# Patient Record
Sex: FEMALE | Race: WHITE | NOT HISPANIC OR LATINO | Employment: FULL TIME | URBAN - METROPOLITAN AREA
[De-identification: names, ages, dates, MRNs, and addresses within clinical notes are randomized per-mention and may not be internally consistent; named-entity substitution may affect disease eponyms.]

---

## 2017-09-05 ENCOUNTER — APPOINTMENT (OUTPATIENT)
Dept: WOMENS IMAGING | Facility: HOSPITAL | Age: 52
End: 2017-09-05

## 2017-09-05 PROCEDURE — 77063 BREAST TOMOSYNTHESIS BI: CPT | Performed by: RADIOLOGY

## 2017-09-05 PROCEDURE — G0202 SCR MAMMO BI INCL CAD: HCPCS | Performed by: RADIOLOGY

## 2017-09-05 PROCEDURE — 77067 SCR MAMMO BI INCL CAD: CPT | Performed by: RADIOLOGY

## 2018-11-29 ENCOUNTER — APPOINTMENT (OUTPATIENT)
Dept: WOMENS IMAGING | Facility: HOSPITAL | Age: 53
End: 2018-11-29

## 2018-11-29 PROCEDURE — 77063 BREAST TOMOSYNTHESIS BI: CPT | Performed by: RADIOLOGY

## 2018-11-29 PROCEDURE — 77067 SCR MAMMO BI INCL CAD: CPT | Performed by: RADIOLOGY

## 2019-03-08 ENCOUNTER — PREP FOR SURGERY (OUTPATIENT)
Dept: OTHER | Facility: HOSPITAL | Age: 54
End: 2019-03-08

## 2019-03-08 DIAGNOSIS — Z12.11 COLON CANCER SCREENING: Primary | ICD-10-CM

## 2019-03-08 DIAGNOSIS — Z80.3 FAMILY HISTORY OF BREAST CANCER: ICD-10-CM

## 2019-03-12 PROBLEM — Z12.11 COLON CANCER SCREENING: Status: ACTIVE | Noted: 2019-03-12

## 2019-03-12 PROBLEM — Z80.3 FAMILY HISTORY OF BREAST CANCER: Status: ACTIVE | Noted: 2019-03-12

## 2019-05-13 ENCOUNTER — ANESTHESIA (OUTPATIENT)
Dept: GASTROENTEROLOGY | Facility: HOSPITAL | Age: 54
End: 2019-05-13

## 2019-05-13 ENCOUNTER — ANESTHESIA EVENT (OUTPATIENT)
Dept: GASTROENTEROLOGY | Facility: HOSPITAL | Age: 54
End: 2019-05-13

## 2019-05-13 ENCOUNTER — HOSPITAL ENCOUNTER (OUTPATIENT)
Facility: HOSPITAL | Age: 54
Setting detail: HOSPITAL OUTPATIENT SURGERY
Discharge: HOME | End: 2019-05-13
Attending: SURGERY | Admitting: SURGERY

## 2019-05-13 VITALS
BODY MASS INDEX: 21.72 KG/M2 | RESPIRATION RATE: 14 BRPM | DIASTOLIC BLOOD PRESSURE: 76 MMHG | SYSTOLIC BLOOD PRESSURE: 100 MMHG | WEIGHT: 135.13 LBS | HEIGHT: 66 IN | HEART RATE: 54 BPM | TEMPERATURE: 98.4 F | OXYGEN SATURATION: 99 %

## 2019-05-13 DIAGNOSIS — Z12.11 COLON CANCER SCREENING: ICD-10-CM

## 2019-05-13 DIAGNOSIS — Z80.3 FAMILY HISTORY OF BREAST CANCER: ICD-10-CM

## 2019-05-13 LAB
B-HCG UR QL: NEGATIVE
INTERNAL NEGATIVE CONTROL: NEGATIVE
INTERNAL POSITIVE CONTROL: POSITIVE
Lab: NORMAL

## 2019-05-13 PROCEDURE — 45380 COLONOSCOPY AND BIOPSY: CPT | Performed by: SURGERY

## 2019-05-13 PROCEDURE — 88305 TISSUE EXAM BY PATHOLOGIST: CPT | Performed by: SURGERY

## 2019-05-13 PROCEDURE — 81025 URINE PREGNANCY TEST: CPT | Performed by: SURGERY

## 2019-05-13 PROCEDURE — 25010000002 GLUCAGON (RDNA) PER 1 MG: Performed by: SURGERY

## 2019-05-13 PROCEDURE — 25010000002 PROPOFOL 10 MG/ML EMULSION: Performed by: NURSE ANESTHETIST, CERTIFIED REGISTERED

## 2019-05-13 RX ORDER — LIDOCAINE HYDROCHLORIDE 20 MG/ML
INJECTION, SOLUTION INFILTRATION; PERINEURAL AS NEEDED
Status: DISCONTINUED | OUTPATIENT
Start: 2019-05-13 | End: 2019-05-13 | Stop reason: SURG

## 2019-05-13 RX ORDER — SODIUM CHLORIDE 0.9 % (FLUSH) 0.9 %
3-10 SYRINGE (ML) INJECTION AS NEEDED
Status: DISCONTINUED | OUTPATIENT
Start: 2019-05-13 | End: 2019-05-13 | Stop reason: HOSPADM

## 2019-05-13 RX ORDER — PROPOFOL 10 MG/ML
VIAL (ML) INTRAVENOUS CONTINUOUS PRN
Status: DISCONTINUED | OUTPATIENT
Start: 2019-05-13 | End: 2019-05-13 | Stop reason: SURG

## 2019-05-13 RX ORDER — SODIUM CHLORIDE, SODIUM LACTATE, POTASSIUM CHLORIDE, CALCIUM CHLORIDE 600; 310; 30; 20 MG/100ML; MG/100ML; MG/100ML; MG/100ML
30 INJECTION, SOLUTION INTRAVENOUS CONTINUOUS PRN
Status: DISCONTINUED | OUTPATIENT
Start: 2019-05-13 | End: 2019-05-13 | Stop reason: HOSPADM

## 2019-05-13 RX ORDER — PROPOFOL 10 MG/ML
VIAL (ML) INTRAVENOUS AS NEEDED
Status: DISCONTINUED | OUTPATIENT
Start: 2019-05-13 | End: 2019-05-13 | Stop reason: SURG

## 2019-05-13 RX ADMIN — SODIUM CHLORIDE, POTASSIUM CHLORIDE, SODIUM LACTATE AND CALCIUM CHLORIDE 30 ML/HR: 600; 310; 30; 20 INJECTION, SOLUTION INTRAVENOUS at 07:28

## 2019-05-13 RX ADMIN — SODIUM CHLORIDE, POTASSIUM CHLORIDE, SODIUM LACTATE AND CALCIUM CHLORIDE: 600; 310; 30; 20 INJECTION, SOLUTION INTRAVENOUS at 08:21

## 2019-05-13 RX ADMIN — PROPOFOL 200 MCG/KG/MIN: 10 INJECTION, EMULSION INTRAVENOUS at 08:24

## 2019-05-13 RX ADMIN — LIDOCAINE HYDROCHLORIDE 60 MG: 20 INJECTION, SOLUTION INFILTRATION; PERINEURAL at 08:24

## 2019-05-13 RX ADMIN — PROPOFOL 50 MG: 10 INJECTION, EMULSION INTRAVENOUS at 08:24

## 2019-05-13 NOTE — H&P
Cc: Endoscopy Visit    HPI: 54 y.o. female here for screening, her first, with no family history of colon cancer.    History reviewed. No pertinent past medical history.    History reviewed. No pertinent surgical history.    has No Known Allergies.       Medication List      ASK your doctor about these medications    progesterone 100 MG capsule  Commonly known as:  PROMETRIUM          History reviewed. No pertinent family history.    Social History     Socioeconomic History   • Marital status:      Spouse name: Not on file   • Number of children: Not on file   • Years of education: Not on file   • Highest education level: Not on file   Tobacco Use   • Smoking status: Never Smoker   • Smokeless tobacco: Never Used   Substance and Sexual Activity   • Alcohol use: Yes     Comment: SOCIAL   • Drug use: No   • Sexual activity: Defer       Vitals:    05/13/19 0716   BP: 112/72   Pulse: 59   Resp: 14   Temp: 98.4 °F (36.9 °C)   SpO2: 100%       Body mass index is 21.81 kg/m².    Physical Exam    General: No acute distress  Lungs: No labored breathing, Pulse oximetry on room air is 100%.  Heart: RRR  Abdo: Soft  Mental:  Awake, alert, and oriented    Imp:     · Screening, average risk     Plan:  · C scope    Chayo Crow MD  8:23 AM

## 2019-05-13 NOTE — ANESTHESIA POSTPROCEDURE EVALUATION
Patient: Latonia Castro    Procedure Summary     Date:  05/13/19 Room / Location:   [facility]    Anesthesia Start:  0821 Anesthesia Stop:  0841    Procedure:  COLONOSCOPY INTO CECUM AND TERMINAL ILEUM WITH COLD BIOPSY POLYPECTOMY (N/A ) Diagnosis:       Colon cancer screening      Family history of breast cancer      (Colon cancer screening [Z12.11])      (Family history of breast cancer [Z80.3])    Surgeon:  Chayo Crow MD Provider:  Betsy Roper MD    Anesthesia Type:  MAC ASA Status:  1          Anesthesia Type: MAC  Last vitals  BP   100/76 (05/13/19 0900)   Temp   36.9 °C (98.4 °F) (05/13/19 0716)   Pulse   54 (05/13/19 0900)   Resp   14 (05/13/19 0900)     SpO2   99 % (05/13/19 0900)     Post Anesthesia Care and Evaluation    Patient location during evaluation: PHASE II  Level of consciousness: awake and alert  Anesthetic complications: No anesthetic complications

## 2019-05-13 NOTE — ANESTHESIA PREPROCEDURE EVALUATION
Anesthesia Evaluation     no history of anesthetic complications:               Airway   Mallampati: I  TM distance: >3 FB  Neck ROM: full  Dental - normal exam     Pulmonary    (-) shortness of breath, recent URI, sleep apnea, not a smoker  Cardiovascular     (+) hyperlipidemia,   (-) hypertension, dysrhythmias, angina      Neuro/Psych  (-) headaches  GI/Hepatic/Renal/Endo    (-) liver disease, no renal disease, diabetes    Musculoskeletal     Abdominal    Substance History      OB/GYN          Other                        Anesthesia Plan    ASA 1     MAC     intravenous induction   Anesthetic plan, all risks, benefits, and alternatives have been provided, discussed and informed consent has been obtained with: patient.

## 2019-05-13 NOTE — OP NOTE
Colonoscopy Procedure Note  Latonia Castro  1965  Date of Procedure: 05/13/19    Pre-operative Diagnosis:    · Screening, average risk    Post-operative Diagnosis:  · Melanosis coli  · Ascending colon polyp, 5 mm, irregular  · Single sigmoid diverticulum    Procedure: Colonoscopy with cold biopsy polypectomy, terminal ileoscopy     Findings/Treatments:   · Melanosis coli  · Ascending colon polyp, 5 mm, irregular  · Single sigmoid diverticulum       Recommendations:   · Colonoscopy in 5 years likely, based on pathology.  We will call  · Discontinue use of senna, cascara, or aloe to reverse findings of melanosis.  · Keep a copy of the photographs of the procedure given to you today for possible need for reference in the future.    Surgeon: Mignon    Anesthetic: MAC per Betsy Roper MD    Indications:  As above     Scope Withdrawal Time:  6 minutes  31 seconds    Procedure Details     MAC anesthesia was induced.  The 180 Colonoscopy was inserted blindly into the rectum and advanced to the cecum, with relative ease,  without need for pressure, lift, or turning.    Cecum was identified by the appendiceal orifice and the ileocecal valve and photographed for documentation.  Terminal ileum was intubated and was normal.    Prep quality was very good, but still required suction..  A careful inspection was made as the scope was withdrawn, including a retroflexed view of the rectum; there was no suggestion of presence of angiodysplasias, colitis, but there was the single polyp and a single sigmoid diverticula, with interventions as listed.  There was melanosis coli throughout.    Retroflexion in the rectum revealed no abnormalities.    Chayo Crow MD  05/13/19  8:40 AM

## 2019-05-14 LAB
CYTO UR: NORMAL
LAB AP CASE REPORT: NORMAL
PATH REPORT.FINAL DX SPEC: NORMAL
PATH REPORT.GROSS SPEC: NORMAL

## 2019-05-15 NOTE — PROGRESS NOTES
[place] (endoscopy liaison),    Please call patient to inform them of these findings and recommendations and ensure that any pamphlets that were to be given to the patient at the hospital were received.     Please make sure a letter is sent to the patient, recall method entered into the computer and the HIM tab updated as far as need for endoscopy follow up.    Thanks  Dr Crow    Date of Procedure: 05/13/19     Pre-operative Diagnosis:    · Screening, average risk     Post-operative Diagnosis:  · Melanosis coli  · Ascending colon polyp, 5 mm, irregular  · Single sigmoid diverticulum     Procedure: Colonoscopy with cold biopsy polypectomy, terminal ileoscopy      Findings/Treatments:   · Melanosis coli  · Ascending colon polyp, 5 mm, irregular;  TUBULAR ADENOMA, TRIGGERING C SCOPE IN 5 YEARS  · Single sigmoid diverticulum    Recommendations:   · Colonoscopy in 5 years likely, based on pathology.  We will call.  5 YEARS  · Discontinue use of senna, cascara, or aloe to reverse findings of melanosis.;  YES.  SHOULD USE FIBER SUPPLEMENT INSTEAD.  · Keep a copy of the photographs of the procedure given to you today for possible need for reference in the future.

## 2019-05-16 ENCOUNTER — TELEPHONE (OUTPATIENT)
Dept: SURGERY | Facility: CLINIC | Age: 54
End: 2019-05-16

## 2019-05-16 NOTE — TELEPHONE ENCOUNTER
----- Message from Chayo Crow MD sent at 5/15/2019  6:39 PM EDT -----  [place] (endoscopy liaison),    Please call patient to inform them of these findings and recommendations and ensure that any pamphlets that were to be given to the patient at the hospital were received.     Please make sure a letter is sent to the patient, recall method entered into the computer and the HIM tab updated as far as need for endoscopy follow up.    Thanks  Dr Crow    Date of Procedure: 05/13/19     Pre-operative Diagnosis:    · Screening, average risk     Post-operative Diagnosis:  · Melanosis coli  · Ascending colon polyp, 5 mm, irregular  · Single sigmoid diverticulum     Procedure: Colonoscopy with cold biopsy polypectomy, terminal ileoscopy      Findings/Treatments:   · Melanosis coli  · Ascending colon polyp, 5 mm, irregular;  TUBULAR ADENOMA, TRIGGERING C SCOPE IN 5 YEARS  · Single sigmoid diverticulum                                        Recommendations:   · Colonoscopy in 5 years likely, based on pathology.  We will call.  5 YEARS  · Discontinue use of senna, cascara, or aloe to reverse findings of melanosis.;  YES.  SHOULD USE FIBER SUPPLEMENT INSTEAD.  · Keep a copy of the photographs of the procedure given to you today for possible need for reference in the future.

## 2019-05-17 ENCOUNTER — TELEPHONE (OUTPATIENT)
Dept: SURGERY | Facility: CLINIC | Age: 54
End: 2019-05-17

## 2021-03-05 ENCOUNTER — APPOINTMENT (OUTPATIENT)
Dept: WOMENS IMAGING | Facility: HOSPITAL | Age: 56
End: 2021-03-05

## 2021-03-05 PROCEDURE — 77067 SCR MAMMO BI INCL CAD: CPT | Performed by: RADIOLOGY

## 2021-03-05 PROCEDURE — 77063 BREAST TOMOSYNTHESIS BI: CPT | Performed by: RADIOLOGY

## 2021-04-29 ENCOUNTER — HOSPITAL ENCOUNTER (OUTPATIENT)
Facility: HOSPITAL | Age: 56
Discharge: HOME | End: 2021-04-30
Attending: EMERGENCY MEDICINE | Admitting: OBSTETRICS & GYNECOLOGY

## 2021-04-29 ENCOUNTER — APPOINTMENT (OUTPATIENT)
Dept: GENERAL RADIOLOGY | Facility: HOSPITAL | Age: 56
End: 2021-04-29

## 2021-04-29 DIAGNOSIS — N93.9 VAGINAL BLEEDING: ICD-10-CM

## 2021-04-29 DIAGNOSIS — D62 ANEMIA DUE TO ACUTE BLOOD LOSS: ICD-10-CM

## 2021-04-29 DIAGNOSIS — R06.02 SHORTNESS OF BREATH: ICD-10-CM

## 2021-04-29 DIAGNOSIS — N93.9 VAGINA BLEEDING: Primary | ICD-10-CM

## 2021-04-29 DIAGNOSIS — R53.1 WEAKNESS: ICD-10-CM

## 2021-04-29 LAB
ABO GROUP BLD: NORMAL
ALBUMIN SERPL-MCNC: 3.8 G/DL (ref 3.5–5.2)
ALBUMIN/GLOB SERPL: 1.5 G/DL
ALP SERPL-CCNC: 45 U/L (ref 39–117)
ALT SERPL W P-5'-P-CCNC: 31 U/L (ref 1–33)
ANION GAP SERPL CALCULATED.3IONS-SCNC: 15.1 MMOL/L (ref 5–15)
AST SERPL-CCNC: 72 U/L (ref 1–32)
BASOPHILS # BLD AUTO: 0.06 10*3/MM3 (ref 0–0.2)
BASOPHILS NFR BLD AUTO: 1.2 % (ref 0–1.5)
BILIRUB SERPL-MCNC: 0.2 MG/DL (ref 0–1.2)
BLD GP AB SCN SERPL QL: NEGATIVE
BUN SERPL-MCNC: 10 MG/DL (ref 6–20)
BUN/CREAT SERPL: 13.7 (ref 7–25)
CALCIUM SPEC-SCNC: 8.4 MG/DL (ref 8.6–10.5)
CHLORIDE SERPL-SCNC: 95 MMOL/L (ref 98–107)
CO2 SERPL-SCNC: 21.9 MMOL/L (ref 22–29)
CREAT SERPL-MCNC: 0.73 MG/DL (ref 0.57–1)
DEPRECATED RDW RBC AUTO: 49.6 FL (ref 37–54)
EOSINOPHIL # BLD AUTO: 0.02 10*3/MM3 (ref 0–0.4)
EOSINOPHIL NFR BLD AUTO: 0.4 % (ref 0.3–6.2)
ERYTHROCYTE [DISTWIDTH] IN BLOOD BY AUTOMATED COUNT: 13.3 % (ref 12.3–15.4)
GFR SERPL CREATININE-BSD FRML MDRD: 83 ML/MIN/1.73
GLOBULIN UR ELPH-MCNC: 2.6 GM/DL
GLUCOSE SERPL-MCNC: 123 MG/DL (ref 65–99)
HCT VFR BLD AUTO: 19.2 % (ref 34–46.6)
HCT VFR BLD AUTO: 21.5 % (ref 34–46.6)
HGB BLD-MCNC: 6.3 G/DL (ref 12–15.9)
HGB BLD-MCNC: 7 G/DL (ref 12–15.9)
HOLD SPECIMEN: NORMAL
HOLD SPECIMEN: NORMAL
IMM GRANULOCYTES # BLD AUTO: 0.03 10*3/MM3 (ref 0–0.05)
IMM GRANULOCYTES NFR BLD AUTO: 0.6 % (ref 0–0.5)
LYMPHOCYTES # BLD AUTO: 0.8 10*3/MM3 (ref 0.7–3.1)
LYMPHOCYTES NFR BLD AUTO: 16.6 % (ref 19.6–45.3)
MCH RBC QN AUTO: 33.9 PG (ref 26.6–33)
MCHC RBC AUTO-ENTMCNC: 32.8 G/DL (ref 31.5–35.7)
MCV RBC AUTO: 103.2 FL (ref 79–97)
MONOCYTES # BLD AUTO: 0.5 10*3/MM3 (ref 0.1–0.9)
MONOCYTES NFR BLD AUTO: 10.4 % (ref 5–12)
NEUTROPHILS NFR BLD AUTO: 3.4 10*3/MM3 (ref 1.7–7)
NEUTROPHILS NFR BLD AUTO: 70.8 % (ref 42.7–76)
NRBC BLD AUTO-RTO: 0.4 /100 WBC (ref 0–0.2)
PLATELET # BLD AUTO: 207 10*3/MM3 (ref 140–450)
PMV BLD AUTO: 11.2 FL (ref 6–12)
POTASSIUM SERPL-SCNC: 4.1 MMOL/L (ref 3.5–5.2)
PROT SERPL-MCNC: 6.4 G/DL (ref 6–8.5)
RBC # BLD AUTO: 1.86 10*6/MM3 (ref 3.77–5.28)
RH BLD: POSITIVE
SARS-COV-2 RNA RESP QL NAA+PROBE: NOT DETECTED
SODIUM SERPL-SCNC: 132 MMOL/L (ref 136–145)
T&S EXPIRATION DATE: NORMAL
TROPONIN T SERPL-MCNC: <0.01 NG/ML (ref 0–0.03)
WBC # BLD AUTO: 4.81 10*3/MM3 (ref 3.4–10.8)
WHOLE BLOOD HOLD SPECIMEN: NORMAL
WHOLE BLOOD HOLD SPECIMEN: NORMAL

## 2021-04-29 PROCEDURE — 86900 BLOOD TYPING SEROLOGIC ABO: CPT

## 2021-04-29 PROCEDURE — C9803 HOPD COVID-19 SPEC COLLECT: HCPCS

## 2021-04-29 PROCEDURE — 86923 COMPATIBILITY TEST ELECTRIC: CPT

## 2021-04-29 PROCEDURE — G0378 HOSPITAL OBSERVATION PER HR: HCPCS

## 2021-04-29 PROCEDURE — 86901 BLOOD TYPING SEROLOGIC RH(D): CPT | Performed by: PHYSICIAN ASSISTANT

## 2021-04-29 PROCEDURE — 96374 THER/PROPH/DIAG INJ IV PUSH: CPT

## 2021-04-29 PROCEDURE — 25010000002 MORPHINE PER 10 MG: Performed by: NURSE PRACTITIONER

## 2021-04-29 PROCEDURE — 86901 BLOOD TYPING SEROLOGIC RH(D): CPT

## 2021-04-29 PROCEDURE — 85014 HEMATOCRIT: CPT | Performed by: INTERNAL MEDICINE

## 2021-04-29 PROCEDURE — P9016 RBC LEUKOCYTES REDUCED: HCPCS

## 2021-04-29 PROCEDURE — 36430 TRANSFUSION BLD/BLD COMPNT: CPT

## 2021-04-29 PROCEDURE — 80053 COMPREHEN METABOLIC PANEL: CPT | Performed by: PHYSICIAN ASSISTANT

## 2021-04-29 PROCEDURE — 86900 BLOOD TYPING SEROLOGIC ABO: CPT | Performed by: PHYSICIAN ASSISTANT

## 2021-04-29 PROCEDURE — 99284 EMERGENCY DEPT VISIT MOD MDM: CPT

## 2021-04-29 PROCEDURE — 36415 COLL VENOUS BLD VENIPUNCTURE: CPT

## 2021-04-29 PROCEDURE — 93010 ELECTROCARDIOGRAM REPORT: CPT | Performed by: INTERNAL MEDICINE

## 2021-04-29 PROCEDURE — 85018 HEMOGLOBIN: CPT | Performed by: INTERNAL MEDICINE

## 2021-04-29 PROCEDURE — 84484 ASSAY OF TROPONIN QUANT: CPT | Performed by: PHYSICIAN ASSISTANT

## 2021-04-29 PROCEDURE — 86850 RBC ANTIBODY SCREEN: CPT | Performed by: PHYSICIAN ASSISTANT

## 2021-04-29 PROCEDURE — 71045 X-RAY EXAM CHEST 1 VIEW: CPT

## 2021-04-29 PROCEDURE — 93005 ELECTROCARDIOGRAM TRACING: CPT | Performed by: PHYSICIAN ASSISTANT

## 2021-04-29 PROCEDURE — U0003 INFECTIOUS AGENT DETECTION BY NUCLEIC ACID (DNA OR RNA); SEVERE ACUTE RESPIRATORY SYNDROME CORONAVIRUS 2 (SARS-COV-2) (CORONAVIRUS DISEASE [COVID-19]), AMPLIFIED PROBE TECHNIQUE, MAKING USE OF HIGH THROUGHPUT TECHNOLOGIES AS DESCRIBED BY CMS-2020-01-R: HCPCS | Performed by: PHYSICIAN ASSISTANT

## 2021-04-29 PROCEDURE — 85025 COMPLETE CBC W/AUTO DIFF WBC: CPT

## 2021-04-29 RX ORDER — NITROGLYCERIN 0.4 MG/1
0.4 TABLET SUBLINGUAL
Status: DISCONTINUED | OUTPATIENT
Start: 2021-04-29 | End: 2021-04-30 | Stop reason: HOSPADM

## 2021-04-29 RX ORDER — ACETAMINOPHEN 325 MG/1
650 TABLET ORAL EVERY 4 HOURS PRN
Status: DISCONTINUED | OUTPATIENT
Start: 2021-04-29 | End: 2021-04-30 | Stop reason: HOSPADM

## 2021-04-29 RX ORDER — MEDROXYPROGESTERONE ACETATE 10 MG/1
10 TABLET ORAL ONCE
Status: COMPLETED | OUTPATIENT
Start: 2021-04-29 | End: 2021-04-29

## 2021-04-29 RX ORDER — ONDANSETRON 4 MG/1
4 TABLET, FILM COATED ORAL EVERY 6 HOURS PRN
Status: DISCONTINUED | OUTPATIENT
Start: 2021-04-29 | End: 2021-04-30 | Stop reason: HOSPADM

## 2021-04-29 RX ORDER — PROGESTERONE 200 MG/1
200 CAPSULE ORAL NIGHTLY
Status: DISCONTINUED | OUTPATIENT
Start: 2021-04-29 | End: 2021-04-29

## 2021-04-29 RX ORDER — ESTRADIOL 0.05 MG/D
1 PATCH TRANSDERMAL 2 TIMES WEEKLY
COMMUNITY

## 2021-04-29 RX ORDER — SODIUM CHLORIDE 9 MG/ML
75 INJECTION, SOLUTION INTRAVENOUS CONTINUOUS
Status: DISCONTINUED | OUTPATIENT
Start: 2021-04-29 | End: 2021-04-30 | Stop reason: HOSPADM

## 2021-04-29 RX ORDER — MORPHINE SULFATE 2 MG/ML
2 INJECTION, SOLUTION INTRAMUSCULAR; INTRAVENOUS ONCE
Status: COMPLETED | OUTPATIENT
Start: 2021-04-29 | End: 2021-04-29

## 2021-04-29 RX ORDER — SODIUM CHLORIDE 0.9 % (FLUSH) 0.9 %
10 SYRINGE (ML) INJECTION AS NEEDED
Status: DISCONTINUED | OUTPATIENT
Start: 2021-04-29 | End: 2021-04-30 | Stop reason: HOSPADM

## 2021-04-29 RX ORDER — UREA 10 %
3 LOTION (ML) TOPICAL NIGHTLY PRN
Status: DISCONTINUED | OUTPATIENT
Start: 2021-04-29 | End: 2021-04-30 | Stop reason: HOSPADM

## 2021-04-29 RX ORDER — ONDANSETRON 2 MG/ML
4 INJECTION INTRAMUSCULAR; INTRAVENOUS EVERY 6 HOURS PRN
Status: DISCONTINUED | OUTPATIENT
Start: 2021-04-29 | End: 2021-04-30 | Stop reason: HOSPADM

## 2021-04-29 RX ADMIN — ACETAMINOPHEN 650 MG: 325 TABLET, FILM COATED ORAL at 20:05

## 2021-04-29 RX ADMIN — TRANEXAMIC ACID 1000 MG: 100 INJECTION, SOLUTION INTRAVENOUS at 20:37

## 2021-04-29 RX ADMIN — SODIUM CHLORIDE, PRESERVATIVE FREE 10 ML: 5 INJECTION INTRAVENOUS at 20:38

## 2021-04-29 RX ADMIN — MORPHINE SULFATE 2 MG: 2 INJECTION, SOLUTION INTRAMUSCULAR; INTRAVENOUS at 22:51

## 2021-04-29 RX ADMIN — MEDROXYPROGESTERONE ACETATE 10 MG: 10 TABLET ORAL at 20:37

## 2021-04-30 ENCOUNTER — ANESTHESIA (OUTPATIENT)
Dept: PERIOP | Facility: HOSPITAL | Age: 56
End: 2021-04-30

## 2021-04-30 ENCOUNTER — ANESTHESIA EVENT (OUTPATIENT)
Dept: PERIOP | Facility: HOSPITAL | Age: 56
End: 2021-04-30

## 2021-04-30 VITALS
SYSTOLIC BLOOD PRESSURE: 93 MMHG | HEIGHT: 66 IN | WEIGHT: 131.8 LBS | BODY MASS INDEX: 21.18 KG/M2 | DIASTOLIC BLOOD PRESSURE: 57 MMHG | HEART RATE: 85 BPM | OXYGEN SATURATION: 97 % | TEMPERATURE: 98 F | RESPIRATION RATE: 18 BRPM

## 2021-04-30 PROBLEM — D62 ANEMIA DUE TO ACUTE BLOOD LOSS: Status: ACTIVE | Noted: 2021-04-30

## 2021-04-30 PROBLEM — N88.2 CERVICAL STENOSIS (UTERINE CERVIX): Status: ACTIVE | Noted: 2021-04-30

## 2021-04-30 PROBLEM — R93.89 THICKENED ENDOMETRIUM: Status: ACTIVE | Noted: 2021-04-30

## 2021-04-30 PROBLEM — N95.0 POST-MENOPAUSAL BLEEDING: Status: ACTIVE | Noted: 2021-04-30

## 2021-04-30 LAB
ANION GAP SERPL CALCULATED.3IONS-SCNC: 7.5 MMOL/L (ref 5–15)
B-HCG UR QL: NEGATIVE
BH BB BLOOD EXPIRATION DATE: NORMAL
BH BB BLOOD EXPIRATION DATE: NORMAL
BH BB BLOOD TYPE BARCODE: 5100
BH BB BLOOD TYPE BARCODE: 5100
BH BB DISPENSE STATUS: NORMAL
BH BB DISPENSE STATUS: NORMAL
BH BB PRODUCT CODE: NORMAL
BH BB PRODUCT CODE: NORMAL
BH BB UNIT NUMBER: NORMAL
BH BB UNIT NUMBER: NORMAL
BUN SERPL-MCNC: 8 MG/DL (ref 6–20)
BUN/CREAT SERPL: 14 (ref 7–25)
CALCIUM SPEC-SCNC: 7.8 MG/DL (ref 8.6–10.5)
CHLORIDE SERPL-SCNC: 104 MMOL/L (ref 98–107)
CO2 SERPL-SCNC: 27.5 MMOL/L (ref 22–29)
CREAT SERPL-MCNC: 0.57 MG/DL (ref 0.57–1)
CROSSMATCH INTERPRETATION: NORMAL
CROSSMATCH INTERPRETATION: NORMAL
DEPRECATED RDW RBC AUTO: 51.8 FL (ref 37–54)
ERYTHROCYTE [DISTWIDTH] IN BLOOD BY AUTOMATED COUNT: 14.6 % (ref 12.3–15.4)
FERRITIN SERPL-MCNC: 81.7 NG/ML (ref 13–150)
GFR SERPL CREATININE-BSD FRML MDRD: 111 ML/MIN/1.73
GLUCOSE SERPL-MCNC: 89 MG/DL (ref 65–99)
HCT VFR BLD AUTO: 24.3 % (ref 34–46.6)
HCT VFR BLD AUTO: 26.3 % (ref 34–46.6)
HGB BLD-MCNC: 7.9 G/DL (ref 12–15.9)
HGB BLD-MCNC: 8.5 G/DL (ref 12–15.9)
IRON 24H UR-MRATE: 26 MCG/DL (ref 37–145)
IRON SATN MFR SERPL: 10 % (ref 20–50)
MCH RBC QN AUTO: 31.9 PG (ref 26.6–33)
MCHC RBC AUTO-ENTMCNC: 32.5 G/DL (ref 31.5–35.7)
MCV RBC AUTO: 98 FL (ref 79–97)
PLATELET # BLD AUTO: 151 10*3/MM3 (ref 140–450)
PMV BLD AUTO: 10.9 FL (ref 6–12)
POTASSIUM SERPL-SCNC: 4.1 MMOL/L (ref 3.5–5.2)
QT INTERVAL: 381 MS
RBC # BLD AUTO: 2.48 10*6/MM3 (ref 3.77–5.28)
SODIUM SERPL-SCNC: 139 MMOL/L (ref 136–145)
TIBC SERPL-MCNC: 270 MCG/DL (ref 298–536)
TRANSFERRIN SERPL-MCNC: 181 MG/DL (ref 200–360)
UNIT  ABO: NORMAL
UNIT  ABO: NORMAL
UNIT  RH: NORMAL
UNIT  RH: NORMAL
WBC # BLD AUTO: 3.72 10*3/MM3 (ref 3.4–10.8)

## 2021-04-30 PROCEDURE — 25010000002 ONDANSETRON PER 1 MG: Performed by: ANESTHESIOLOGY

## 2021-04-30 PROCEDURE — 85014 HEMATOCRIT: CPT | Performed by: INTERNAL MEDICINE

## 2021-04-30 PROCEDURE — 25010000002 MIDAZOLAM PER 1 MG: Performed by: ANESTHESIOLOGY

## 2021-04-30 PROCEDURE — 25010000002 PROPOFOL 10 MG/ML EMULSION: Performed by: ANESTHESIOLOGY

## 2021-04-30 PROCEDURE — 25010000002 DEXAMETHASONE PER 1 MG: Performed by: ANESTHESIOLOGY

## 2021-04-30 PROCEDURE — 25010000002 FENTANYL CITRATE (PF) 100 MCG/2ML SOLUTION: Performed by: ANESTHESIOLOGY

## 2021-04-30 PROCEDURE — 81025 URINE PREGNANCY TEST: CPT | Performed by: OBSTETRICS & GYNECOLOGY

## 2021-04-30 PROCEDURE — 25010000002 PHENYLEPHRINE PER 1 ML: Performed by: ANESTHESIOLOGY

## 2021-04-30 PROCEDURE — G0378 HOSPITAL OBSERVATION PER HR: HCPCS

## 2021-04-30 PROCEDURE — 85018 HEMOGLOBIN: CPT | Performed by: INTERNAL MEDICINE

## 2021-04-30 PROCEDURE — 88305 TISSUE EXAM BY PATHOLOGIST: CPT | Performed by: OBSTETRICS & GYNECOLOGY

## 2021-04-30 PROCEDURE — 80048 BASIC METABOLIC PNL TOTAL CA: CPT | Performed by: INTERNAL MEDICINE

## 2021-04-30 PROCEDURE — 85027 COMPLETE CBC AUTOMATED: CPT | Performed by: INTERNAL MEDICINE

## 2021-04-30 PROCEDURE — 83540 ASSAY OF IRON: CPT | Performed by: INTERNAL MEDICINE

## 2021-04-30 PROCEDURE — 84466 ASSAY OF TRANSFERRIN: CPT | Performed by: INTERNAL MEDICINE

## 2021-04-30 PROCEDURE — 82728 ASSAY OF FERRITIN: CPT | Performed by: INTERNAL MEDICINE

## 2021-04-30 PROCEDURE — C1782 MORCELLATOR: HCPCS | Performed by: OBSTETRICS & GYNECOLOGY

## 2021-04-30 RX ORDER — PROMETHAZINE HYDROCHLORIDE 25 MG/1
25 TABLET ORAL ONCE AS NEEDED
Status: DISCONTINUED | OUTPATIENT
Start: 2021-04-30 | End: 2021-04-30 | Stop reason: HOSPADM

## 2021-04-30 RX ORDER — SODIUM CHLORIDE 0.9 % (FLUSH) 0.9 %
3-10 SYRINGE (ML) INJECTION AS NEEDED
Status: DISCONTINUED | OUTPATIENT
Start: 2021-04-30 | End: 2021-04-30 | Stop reason: HOSPADM

## 2021-04-30 RX ORDER — LABETALOL HYDROCHLORIDE 5 MG/ML
5 INJECTION, SOLUTION INTRAVENOUS
Status: DISCONTINUED | OUTPATIENT
Start: 2021-04-30 | End: 2021-04-30 | Stop reason: HOSPADM

## 2021-04-30 RX ORDER — SODIUM CHLORIDE 0.9 % (FLUSH) 0.9 %
10 SYRINGE (ML) INJECTION AS NEEDED
Status: DISCONTINUED | OUTPATIENT
Start: 2021-04-30 | End: 2021-04-30 | Stop reason: HOSPADM

## 2021-04-30 RX ORDER — LIDOCAINE HYDROCHLORIDE 10 MG/ML
0.5 INJECTION, SOLUTION EPIDURAL; INFILTRATION; INTRACAUDAL; PERINEURAL ONCE AS NEEDED
Status: DISCONTINUED | OUTPATIENT
Start: 2021-04-30 | End: 2021-04-30 | Stop reason: HOSPADM

## 2021-04-30 RX ORDER — MIDAZOLAM HYDROCHLORIDE 1 MG/ML
1 INJECTION INTRAMUSCULAR; INTRAVENOUS
Status: DISCONTINUED | OUTPATIENT
Start: 2021-04-30 | End: 2021-04-30 | Stop reason: HOSPADM

## 2021-04-30 RX ORDER — SODIUM CHLORIDE 0.9 % (FLUSH) 0.9 %
3 SYRINGE (ML) INJECTION EVERY 12 HOURS SCHEDULED
Status: DISCONTINUED | OUTPATIENT
Start: 2021-04-30 | End: 2021-04-30 | Stop reason: HOSPADM

## 2021-04-30 RX ORDER — SODIUM CHLORIDE, SODIUM LACTATE, POTASSIUM CHLORIDE, CALCIUM CHLORIDE 600; 310; 30; 20 MG/100ML; MG/100ML; MG/100ML; MG/100ML
125 INJECTION, SOLUTION INTRAVENOUS CONTINUOUS
Status: DISCONTINUED | OUTPATIENT
Start: 2021-04-30 | End: 2021-04-30 | Stop reason: HOSPADM

## 2021-04-30 RX ORDER — OXYCODONE AND ACETAMINOPHEN 7.5; 325 MG/1; MG/1
1 TABLET ORAL ONCE AS NEEDED
Status: DISCONTINUED | OUTPATIENT
Start: 2021-04-30 | End: 2021-04-30 | Stop reason: HOSPADM

## 2021-04-30 RX ORDER — PROPOFOL 10 MG/ML
VIAL (ML) INTRAVENOUS AS NEEDED
Status: DISCONTINUED | OUTPATIENT
Start: 2021-04-30 | End: 2021-04-30 | Stop reason: SURG

## 2021-04-30 RX ORDER — FENTANYL CITRATE 50 UG/ML
50 INJECTION, SOLUTION INTRAMUSCULAR; INTRAVENOUS
Status: DISCONTINUED | OUTPATIENT
Start: 2021-04-30 | End: 2021-04-30 | Stop reason: HOSPADM

## 2021-04-30 RX ORDER — EPHEDRINE SULFATE 50 MG/ML
5 INJECTION, SOLUTION INTRAVENOUS ONCE AS NEEDED
Status: DISCONTINUED | OUTPATIENT
Start: 2021-04-30 | End: 2021-04-30 | Stop reason: HOSPADM

## 2021-04-30 RX ORDER — HYDROMORPHONE HYDROCHLORIDE 1 MG/ML
0.5 INJECTION, SOLUTION INTRAMUSCULAR; INTRAVENOUS; SUBCUTANEOUS
Status: DISCONTINUED | OUTPATIENT
Start: 2021-04-30 | End: 2021-04-30 | Stop reason: HOSPADM

## 2021-04-30 RX ORDER — FENTANYL CITRATE 50 UG/ML
INJECTION, SOLUTION INTRAMUSCULAR; INTRAVENOUS AS NEEDED
Status: DISCONTINUED | OUTPATIENT
Start: 2021-04-30 | End: 2021-04-30 | Stop reason: SURG

## 2021-04-30 RX ORDER — DIPHENHYDRAMINE HYDROCHLORIDE 50 MG/ML
12.5 INJECTION INTRAMUSCULAR; INTRAVENOUS
Status: DISCONTINUED | OUTPATIENT
Start: 2021-04-30 | End: 2021-04-30 | Stop reason: HOSPADM

## 2021-04-30 RX ORDER — DEXAMETHASONE SODIUM PHOSPHATE 10 MG/ML
INJECTION INTRAMUSCULAR; INTRAVENOUS AS NEEDED
Status: DISCONTINUED | OUTPATIENT
Start: 2021-04-30 | End: 2021-04-30 | Stop reason: SURG

## 2021-04-30 RX ORDER — HYDRALAZINE HYDROCHLORIDE 20 MG/ML
5 INJECTION INTRAMUSCULAR; INTRAVENOUS
Status: DISCONTINUED | OUTPATIENT
Start: 2021-04-30 | End: 2021-04-30 | Stop reason: HOSPADM

## 2021-04-30 RX ORDER — DIPHENHYDRAMINE HCL 25 MG
25 CAPSULE ORAL
Status: DISCONTINUED | OUTPATIENT
Start: 2021-04-30 | End: 2021-04-30 | Stop reason: HOSPADM

## 2021-04-30 RX ORDER — PROMETHAZINE HYDROCHLORIDE 25 MG/1
25 SUPPOSITORY RECTAL ONCE AS NEEDED
Status: DISCONTINUED | OUTPATIENT
Start: 2021-04-30 | End: 2021-04-30 | Stop reason: HOSPADM

## 2021-04-30 RX ORDER — ONDANSETRON 2 MG/ML
4 INJECTION INTRAMUSCULAR; INTRAVENOUS ONCE AS NEEDED
Status: DISCONTINUED | OUTPATIENT
Start: 2021-04-30 | End: 2021-04-30 | Stop reason: HOSPADM

## 2021-04-30 RX ORDER — HYDROCODONE BITARTRATE AND ACETAMINOPHEN 7.5; 325 MG/1; MG/1
1 TABLET ORAL ONCE AS NEEDED
Status: COMPLETED | OUTPATIENT
Start: 2021-04-30 | End: 2021-04-30

## 2021-04-30 RX ORDER — SODIUM CHLORIDE, SODIUM LACTATE, POTASSIUM CHLORIDE, CALCIUM CHLORIDE 600; 310; 30; 20 MG/100ML; MG/100ML; MG/100ML; MG/100ML
9 INJECTION, SOLUTION INTRAVENOUS CONTINUOUS
Status: DISCONTINUED | OUTPATIENT
Start: 2021-04-30 | End: 2021-04-30 | Stop reason: HOSPADM

## 2021-04-30 RX ORDER — NALOXONE HCL 0.4 MG/ML
0.2 VIAL (ML) INJECTION AS NEEDED
Status: DISCONTINUED | OUTPATIENT
Start: 2021-04-30 | End: 2021-04-30 | Stop reason: HOSPADM

## 2021-04-30 RX ORDER — ACETAMINOPHEN 500 MG
1000 TABLET ORAL ONCE
Status: COMPLETED | OUTPATIENT
Start: 2021-04-30 | End: 2021-04-30

## 2021-04-30 RX ORDER — MAGNESIUM HYDROXIDE 1200 MG/15ML
LIQUID ORAL AS NEEDED
Status: DISCONTINUED | OUTPATIENT
Start: 2021-04-30 | End: 2021-04-30 | Stop reason: HOSPADM

## 2021-04-30 RX ORDER — LIDOCAINE HYDROCHLORIDE 20 MG/ML
INJECTION, SOLUTION INFILTRATION; PERINEURAL AS NEEDED
Status: DISCONTINUED | OUTPATIENT
Start: 2021-04-30 | End: 2021-04-30 | Stop reason: SURG

## 2021-04-30 RX ORDER — FLUMAZENIL 0.1 MG/ML
0.2 INJECTION INTRAVENOUS AS NEEDED
Status: DISCONTINUED | OUTPATIENT
Start: 2021-04-30 | End: 2021-04-30 | Stop reason: HOSPADM

## 2021-04-30 RX ORDER — DOXYCYCLINE HYCLATE 50 MG/1
324 CAPSULE, GELATIN COATED ORAL
Qty: 30 TABLET | Refills: 0 | Status: SHIPPED | OUTPATIENT
Start: 2021-04-30

## 2021-04-30 RX ORDER — MIDAZOLAM HYDROCHLORIDE 1 MG/ML
2 INJECTION INTRAMUSCULAR; INTRAVENOUS
Status: DISCONTINUED | OUTPATIENT
Start: 2021-04-30 | End: 2021-04-30 | Stop reason: HOSPADM

## 2021-04-30 RX ORDER — ONDANSETRON 2 MG/ML
INJECTION INTRAMUSCULAR; INTRAVENOUS AS NEEDED
Status: DISCONTINUED | OUTPATIENT
Start: 2021-04-30 | End: 2021-04-30 | Stop reason: SURG

## 2021-04-30 RX ADMIN — SODIUM CHLORIDE 75 ML/HR: 9 INJECTION, SOLUTION INTRAVENOUS at 01:17

## 2021-04-30 RX ADMIN — MIDAZOLAM 1 MG: 1 INJECTION INTRAMUSCULAR; INTRAVENOUS at 08:31

## 2021-04-30 RX ADMIN — PHENYLEPHRINE HYDROCHLORIDE 100 MCG: 10 INJECTION INTRAVENOUS at 09:21

## 2021-04-30 RX ADMIN — PHENYLEPHRINE HYDROCHLORIDE 100 MCG: 10 INJECTION INTRAVENOUS at 08:56

## 2021-04-30 RX ADMIN — FENTANYL CITRATE 50 MCG: 50 INJECTION INTRAMUSCULAR; INTRAVENOUS at 09:29

## 2021-04-30 RX ADMIN — PHENYLEPHRINE HYDROCHLORIDE 100 MCG: 10 INJECTION INTRAVENOUS at 09:28

## 2021-04-30 RX ADMIN — PHENYLEPHRINE HYDROCHLORIDE 100 MCG: 10 INJECTION INTRAVENOUS at 09:01

## 2021-04-30 RX ADMIN — PHENYLEPHRINE HYDROCHLORIDE 100 MCG: 10 INJECTION INTRAVENOUS at 09:14

## 2021-04-30 RX ADMIN — SODIUM CHLORIDE, POTASSIUM CHLORIDE, SODIUM LACTATE AND CALCIUM CHLORIDE 125 ML/HR: 600; 310; 30; 20 INJECTION, SOLUTION INTRAVENOUS at 12:09

## 2021-04-30 RX ADMIN — PHENYLEPHRINE HYDROCHLORIDE 100 MCG: 10 INJECTION INTRAVENOUS at 09:08

## 2021-04-30 RX ADMIN — PHENYLEPHRINE HYDROCHLORIDE 100 MCG: 10 INJECTION INTRAVENOUS at 09:16

## 2021-04-30 RX ADMIN — LIDOCAINE HYDROCHLORIDE 100 MG: 20 INJECTION, SOLUTION INFILTRATION; PERINEURAL at 08:52

## 2021-04-30 RX ADMIN — DEXAMETHASONE SODIUM PHOSPHATE 8 MG: 10 INJECTION INTRAMUSCULAR; INTRAVENOUS at 09:05

## 2021-04-30 RX ADMIN — SODIUM CHLORIDE, POTASSIUM CHLORIDE, SODIUM LACTATE AND CALCIUM CHLORIDE 9 ML/HR: 600; 310; 30; 20 INJECTION, SOLUTION INTRAVENOUS at 08:31

## 2021-04-30 RX ADMIN — ACETAMINOPHEN 1000 MG: 500 TABLET, FILM COATED ORAL at 08:31

## 2021-04-30 RX ADMIN — PHENYLEPHRINE HYDROCHLORIDE 100 MCG: 10 INJECTION INTRAVENOUS at 09:18

## 2021-04-30 RX ADMIN — PROPOFOL 200 MG: 10 INJECTION, EMULSION INTRAVENOUS at 08:53

## 2021-04-30 RX ADMIN — PHENYLEPHRINE HYDROCHLORIDE 100 MCG: 10 INJECTION INTRAVENOUS at 09:05

## 2021-04-30 RX ADMIN — HYDROCODONE BITARTRATE AND ACETAMINOPHEN 1 TABLET: 7.5; 325 TABLET ORAL at 10:18

## 2021-04-30 RX ADMIN — PHENYLEPHRINE HYDROCHLORIDE 100 MCG: 10 INJECTION INTRAVENOUS at 09:12

## 2021-04-30 RX ADMIN — PHENYLEPHRINE HYDROCHLORIDE 100 MCG: 10 INJECTION INTRAVENOUS at 09:25

## 2021-04-30 RX ADMIN — ONDANSETRON 4 MG: 2 INJECTION INTRAMUSCULAR; INTRAVENOUS at 09:29

## 2021-04-30 RX ADMIN — PHENYLEPHRINE HYDROCHLORIDE 100 MCG: 10 INJECTION INTRAVENOUS at 09:00

## 2021-04-30 RX ADMIN — PHENYLEPHRINE HYDROCHLORIDE 100 MCG: 10 INJECTION INTRAVENOUS at 09:23

## 2021-04-30 NOTE — ANESTHESIA PROCEDURE NOTES
Airway  Urgency: elective    Date/Time: 4/30/2021 8:55 AM  Airway not difficult    General Information and Staff    Patient location during procedure: OR  Anesthesiologist: Jeff Nation MD    Indications and Patient Condition  Indications for airway management: airway protection    Preoxygenated: yes  Mask difficulty assessment: 1 - vent by mask    Final Airway Details  Final airway type: supraglottic airway      Successful airway: LMA  Size 4    Number of attempts at approach: 1  Assessment: lips, teeth, and gum same as pre-op

## 2021-04-30 NOTE — ANESTHESIA POSTPROCEDURE EVALUATION
Patient: Latonia Castro    Procedure Summary     Date: 04/30/21 Room / Location: [facility] OR  / [facility] MAIN OR    Anesthesia Start: 0845 Anesthesia Stop: 0949    Procedure: DILATATION AND CURETTAGE HYSTEROSCOPY with myosure,removal of polyp (N/A Uterus) Diagnosis:     Surgeons: Deidre Isbell MD Provider: Jeff Nation MD    Anesthesia Type: general ASA Status: 2          Anesthesia Type: general    Vitals  Vitals Value Taken Time   BP 95/73 04/30/21 1045   Temp 36.7 °C (98 °F) 04/30/21 1045   Pulse 61 04/30/21 1045   Resp 16 04/30/21 1045   SpO2 98 % 04/30/21 1047   Vitals shown include unvalidated device data.        Post Anesthesia Care and Evaluation      Comments: Pt. Discharged prior to being evaluated by anesthesia.  Chart is reviewed and no complications are noted.  THIS CASE IS NOT MEDICALLY DIRECTED

## 2021-04-30 NOTE — ANESTHESIA PREPROCEDURE EVALUATION
Anesthesia Evaluation     Patient summary reviewed and Nursing notes reviewed   no history of anesthetic complications:  NPO Solid Status: > 8 hours  NPO Liquid Status: > 8 hours           Airway   Mallampati: II  TM distance: >3 FB  Neck ROM: full  No difficulty expected  Dental      Pulmonary    (-) asthma, sleep apnea, rhonchi, decreased breath sounds, wheezes, not a smoker  Cardiovascular   Exercise tolerance: good (4-7 METS)    Rhythm: regular  Rate: normal    (-) hypertension, CAD, angina, STOKES, murmur      Neuro/Psych  (-) seizures, CVA  GI/Hepatic/Renal/Endo    (-) liver disease, no renal disease, diabetes, no thyroid disorder    Musculoskeletal     Abdominal     Abdomen: soft.   Substance History      OB/GYN          Other          Other Comment: Anemia Hgb 7.8 after 2 units prbcs                   Anesthesia Plan    ASA 2     general   (LMA 4 )  intravenous induction     Anesthetic plan, all risks, benefits, and alternatives have been provided, discussed and informed consent has been obtained with: patient.

## 2021-05-03 LAB
LAB AP CASE REPORT: NORMAL
PATH REPORT.FINAL DX SPEC: NORMAL
PATH REPORT.GROSS SPEC: NORMAL

## 2022-07-14 ENCOUNTER — APPOINTMENT (OUTPATIENT)
Dept: WOMENS IMAGING | Facility: HOSPITAL | Age: 57
End: 2022-07-14

## 2022-07-14 PROCEDURE — 77063 BREAST TOMOSYNTHESIS BI: CPT | Performed by: RADIOLOGY

## 2022-07-14 PROCEDURE — 77067 SCR MAMMO BI INCL CAD: CPT | Performed by: RADIOLOGY

## 2023-07-25 ENCOUNTER — APPOINTMENT (OUTPATIENT)
Dept: WOMENS IMAGING | Facility: HOSPITAL | Age: 58
End: 2023-07-25
Payer: COMMERCIAL

## 2023-07-25 PROCEDURE — 77063 BREAST TOMOSYNTHESIS BI: CPT | Performed by: RADIOLOGY

## 2023-07-25 PROCEDURE — 77067 SCR MAMMO BI INCL CAD: CPT | Performed by: RADIOLOGY

## (undated) DEVICE — LOU D & C HYSTEROSCOPY: Brand: MEDLINE INDUSTRIES, INC.

## (undated) DEVICE — GLV SURG SIGNATURE ESSENTIAL PF LTX SZ7.5

## (undated) DEVICE — SOL IRR NACL 0.9PCT 3000ML

## (undated) DEVICE — TUBING, SUCTION, 1/4" X 10', STRAIGHT: Brand: MEDLINE

## (undated) DEVICE — DRAPE,UNDERBUTTOCKS,PCH,STERILE: Brand: MEDLINE

## (undated) DEVICE — DEV TISS REMOV MYOSURE/LITE HYSTEROSCP

## (undated) DEVICE — SEAL HYSTERSCOPE/OUTFLOW CHANNEL MYOSURE

## (undated) DEVICE — CANNULA,ADULT,SOFT-TOUCH,7'TUBE,UC: Brand: PENDING

## (undated) DEVICE — SINGLE-USE BIOPSY FORCEPS: Brand: RADIAL JAW 4

## (undated) DEVICE — THE TORRENT IRRIGATION SCOPE CONNECTOR IS USED WITH THE TORRENT IRRIGATION TUBING TO PROVIDE IRRIGATION FLUIDS SUCH AS STERILE WATER DURING GASTROINTESTINAL ENDOSCOPIC PROCEDURES WHEN USED IN CONJUNCTION WITH AN IRRIGATION PUMP (OR ELECTROSURGICAL UNIT).: Brand: TORRENT

## (undated) DEVICE — STRAP STIRUP WO/ RNG

## (undated) DEVICE — Device: Brand: DEFENDO AIR/WATER/SUCTION AND BIOPSY VALVE